# Patient Record
Sex: MALE | Race: WHITE | Employment: FULL TIME | URBAN - METROPOLITAN AREA
[De-identification: names, ages, dates, MRNs, and addresses within clinical notes are randomized per-mention and may not be internally consistent; named-entity substitution may affect disease eponyms.]

---

## 2020-09-29 ENCOUNTER — ANESTHESIA EVENT (OUTPATIENT)
Dept: SURGERY | Age: 40
End: 2020-09-29
Payer: COMMERCIAL

## 2020-09-29 ENCOUNTER — HOSPITAL ENCOUNTER (OUTPATIENT)
Age: 40
Setting detail: OUTPATIENT SURGERY
Discharge: HOME | End: 2020-09-29
Attending: OPHTHALMOLOGY | Admitting: OPHTHALMOLOGY
Payer: COMMERCIAL

## 2020-09-29 ENCOUNTER — ANESTHESIA (OUTPATIENT)
Dept: SURGERY | Age: 40
End: 2020-09-29
Payer: COMMERCIAL

## 2020-09-29 VITALS
BODY MASS INDEX: 32.26 KG/M2 | SYSTOLIC BLOOD PRESSURE: 127 MMHG | OXYGEN SATURATION: 100 % | HEIGHT: 71 IN | WEIGHT: 230.4 LBS | TEMPERATURE: 98.1 F | HEART RATE: 89 BPM | RESPIRATION RATE: 18 BRPM | DIASTOLIC BLOOD PRESSURE: 91 MMHG

## 2020-09-29 LAB
COVID-19 RAPID TEST, COVR: NOT DETECTED
SOURCE, COVRS: NORMAL
SPECIMEN TYPE, XMCV1T: NORMAL

## 2020-09-29 PROCEDURE — 77030026076 HC LNS MCSCP SPRVW DSP MCGM -B: Performed by: OPHTHALMOLOGY

## 2020-09-29 PROCEDURE — 74011250637 HC RX REV CODE- 250/637: Performed by: NURSE ANESTHETIST, CERTIFIED REGISTERED

## 2020-09-29 PROCEDURE — 74011250636 HC RX REV CODE- 250/636: Performed by: NURSE ANESTHETIST, CERTIFIED REGISTERED

## 2020-09-29 PROCEDURE — 74011000250 HC RX REV CODE- 250: Performed by: OPHTHALMOLOGY

## 2020-09-29 PROCEDURE — 00145 ANES PX EYE VITREORTNL SURG: CPT | Performed by: ANESTHESIOLOGY

## 2020-09-29 PROCEDURE — 77030013311: Performed by: OPHTHALMOLOGY

## 2020-09-29 PROCEDURE — 2709999900 HC NON-CHARGEABLE SUPPLY: Performed by: OPHTHALMOLOGY

## 2020-09-29 PROCEDURE — 76010000138 HC OR TIME 0.5 TO 1 HR: Performed by: OPHTHALMOLOGY

## 2020-09-29 PROCEDURE — 76060000032 HC ANESTHESIA 0.5 TO 1 HR: Performed by: OPHTHALMOLOGY

## 2020-09-29 PROCEDURE — 00145 ANES PX EYE VITREORTNL SURG: CPT | Performed by: NURSE ANESTHETIST, CERTIFIED REGISTERED

## 2020-09-29 PROCEDURE — 87635 SARS-COV-2 COVID-19 AMP PRB: CPT

## 2020-09-29 PROCEDURE — 74011250636 HC RX REV CODE- 250/636: Performed by: OPHTHALMOLOGY

## 2020-09-29 PROCEDURE — 76210000020 HC REC RM PH II FIRST 0.5 HR: Performed by: OPHTHALMOLOGY

## 2020-09-29 RX ORDER — FAMOTIDINE 20 MG/1
20 TABLET, FILM COATED ORAL ONCE
Status: COMPLETED | OUTPATIENT
Start: 2020-09-29 | End: 2020-09-29

## 2020-09-29 RX ORDER — FAMOTIDINE 20 MG/1
20 TABLET, FILM COATED ORAL 2 TIMES DAILY
COMMUNITY

## 2020-09-29 RX ORDER — CYCLOPENTOLATE HYDROCHLORIDE 10 MG/ML
1 SOLUTION/ DROPS OPHTHALMIC
Status: COMPLETED | OUTPATIENT
Start: 2020-09-29 | End: 2020-09-29

## 2020-09-29 RX ORDER — GENTAMICIN SULFATE 40 MG/ML
INJECTION, SOLUTION INTRAMUSCULAR; INTRAVENOUS AS NEEDED
Status: DISCONTINUED | OUTPATIENT
Start: 2020-09-29 | End: 2020-09-29 | Stop reason: HOSPADM

## 2020-09-29 RX ORDER — DEXAMETHASONE SODIUM PHOSPHATE 4 MG/ML
INJECTION, SOLUTION INTRA-ARTICULAR; INTRALESIONAL; INTRAMUSCULAR; INTRAVENOUS; SOFT TISSUE AS NEEDED
Status: DISCONTINUED | OUTPATIENT
Start: 2020-09-29 | End: 2020-09-29 | Stop reason: HOSPADM

## 2020-09-29 RX ORDER — MIDAZOLAM HYDROCHLORIDE 1 MG/ML
INJECTION, SOLUTION INTRAMUSCULAR; INTRAVENOUS AS NEEDED
Status: DISCONTINUED | OUTPATIENT
Start: 2020-09-29 | End: 2020-09-29 | Stop reason: HOSPADM

## 2020-09-29 RX ORDER — LIDOCAINE HYDROCHLORIDE 10 MG/ML
0.1 INJECTION, SOLUTION EPIDURAL; INFILTRATION; INTRACAUDAL; PERINEURAL AS NEEDED
Status: DISCONTINUED | OUTPATIENT
Start: 2020-09-29 | End: 2020-09-29 | Stop reason: HOSPADM

## 2020-09-29 RX ORDER — PHENYLEPHRINE HYDROCHLORIDE 25 MG/ML
1 SOLUTION/ DROPS OPHTHALMIC
Status: COMPLETED | OUTPATIENT
Start: 2020-09-29 | End: 2020-09-29

## 2020-09-29 RX ORDER — SODIUM CHLORIDE, SODIUM LACTATE, POTASSIUM CHLORIDE, CALCIUM CHLORIDE 600; 310; 30; 20 MG/100ML; MG/100ML; MG/100ML; MG/100ML
50 INJECTION, SOLUTION INTRAVENOUS CONTINUOUS
Status: DISCONTINUED | OUTPATIENT
Start: 2020-09-29 | End: 2020-09-29 | Stop reason: HOSPADM

## 2020-09-29 RX ORDER — ATORVASTATIN CALCIUM 20 MG/1
20 TABLET, FILM COATED ORAL DAILY
COMMUNITY

## 2020-09-29 RX ADMIN — MIDAZOLAM 2 MG: 1 INJECTION INTRAMUSCULAR; INTRAVENOUS at 16:15

## 2020-09-29 RX ADMIN — FAMOTIDINE 20 MG: 20 TABLET, FILM COATED ORAL at 12:37

## 2020-09-29 RX ADMIN — PHENYLEPHRINE HYDROCHLORIDE 1 DROP: 25 SOLUTION/ DROPS OPHTHALMIC at 13:00

## 2020-09-29 RX ADMIN — PHENYLEPHRINE HYDROCHLORIDE 1 DROP: 25 SOLUTION/ DROPS OPHTHALMIC at 13:58

## 2020-09-29 RX ADMIN — CYCLOPENTOLATE HYDROCHLORIDE 1 DROP: 10 SOLUTION/ DROPS OPHTHALMIC at 13:58

## 2020-09-29 RX ADMIN — CYCLOPENTOLATE HYDROCHLORIDE 1 DROP: 10 SOLUTION/ DROPS OPHTHALMIC at 13:00

## 2020-09-29 RX ADMIN — SODIUM CHLORIDE, SODIUM LACTATE, POTASSIUM CHLORIDE, AND CALCIUM CHLORIDE 50 ML/HR: 600; 310; 30; 20 INJECTION, SOLUTION INTRAVENOUS at 12:35

## 2020-09-29 RX ADMIN — CYCLOPENTOLATE HYDROCHLORIDE 1 DROP: 10 SOLUTION/ DROPS OPHTHALMIC at 13:29

## 2020-09-29 RX ADMIN — PHENYLEPHRINE HYDROCHLORIDE 1 DROP: 25 SOLUTION/ DROPS OPHTHALMIC at 13:29

## 2020-09-29 RX ADMIN — MIDAZOLAM 2 MG: 1 INJECTION INTRAMUSCULAR; INTRAVENOUS at 16:28

## 2020-09-29 NOTE — DISCHARGE INSTRUCTIONS
Vitrectomy: What to Expect at [address] [facility]     Vitrectomy is a surgery to remove the vitreous gel from the middle of your eye. Vitreous gel (also called vitreous humor) is a thick, colorless, gel-like fluid that fills the large space in the middle of the eye, behind the lens. It helps the eyeball maintain its shape. During surgery, the doctor used small tools to remove the vitreous gel. (After a while, the eye makes new fluid that fills in the space again.) Then the doctor may have treated eye problems, such as a retinal detachment, a vitreous hemorrhage (bleeding in the eye), scar tissue on the retina, or tears or holes in the macula, an important part of the retina. The retina is the layer of nerve tissue at the back of the eye. At the end of the surgery, the doctor may have injected an oil or gas bubble into the eye. It lightly presses the retina against the wall of the eye. You will need to keep your head in a certain position for most of the day and night while the eye heals. If an oil bubble is used, you will need another surgery to remove the oil after the eye has healed. After the surgery, your eye may be swollen, red, or tender for several weeks. You might have some pain in your eye and your vision may be blurry for a few days after the surgery. You will need 2 to 4 weeks to recover before you can do your normal activities again. It may take longer for your vision to get back to normal.  This care sheet gives you a general idea about how long it will take for you to recover. But each person recovers at a different pace. Follow the steps below to get better as quickly as possible. How can you care for yourself at home? Activity    · Rest when you feel tired.     · Allow the eye to heal. Don't do things that might cause you to move your head.  This includes moving quickly, lifting anything heavy, or doing activities such as cleaning or gardening.     · If your doctor used an oil or gas bubble to hold the retina in place, keep your head in a certain position for most of the day and night for 1 to 3 weeks after the surgery. Make a plan for this part of your recovery, because it will be hard to do some daily activities. Your doctor will give you specific instructions. ? Do not lie on your back, or the bubble will move to the front of the eye and press against the lens instead of the retina.     · If your doctor used a gas bubble, avoid airplane travel until your doctor tells you it is safe. This is because the change in altitude may cause the gas bubble to expand and increase the pressure inside the eye.     · You will probably need to take 2 to 4 weeks off from work. It depends on the type of work you do and how you feel.     · You may drive when your vision allows it. If you are not sure, ask your doctor. Diet    · You can eat your normal diet. If your stomach is upset, try bland, low-fat foods like plain rice, broiled chicken, toast, and yogurt. Medicines    · Your doctor will tell you if and when you can restart your medicines. He or she will also give you instructions about taking any new medicines.     · If you take aspirin or some other blood thinner, be sure to talk to your doctor. He or she will tell you if and when to start taking this medicine again. Make sure that you understand exactly what your doctor wants you to do.     · Be safe with medicines. Read and follow all instructions on the label. ? If the doctor gave you a prescription medicine for pain, take it as prescribed. ? If you are not taking a prescription pain medicine, ask your doctor if you can take an over-the-counter medicine.     · You will need to use eyedrops for up to 6 weeks. Ice and elevation    · Close your eye and put ice or a cold pack on it for 10 to 20 minutes at a time. Try to do this every 1 to 2 hours for the next 3 days (when you are awake) or until the swelling goes down.  Put a thin cloth between the ice and your skin. Other instructions    · You can shower and wash your hair and face. But don't get any soap in your eye. You may want to use a wash cloth to wash your face. Some people wear swimming goggles.     · Wear sunglasses during the day. You may have to wear an eye patch or shield for a few days. Follow-up care is a key part of your treatment and safety. Be sure to make and go to all appointments, and call your doctor if you are having problems. It's also a good idea to know your test results and keep a list of the medicines you take. When should you call for help? Call 911 anytime you think you may need emergency care. For example, call if:    · You have a sudden loss of vision.     · You have severe eye pain. Call your doctor now or seek immediate medical care if:    · Your vision gets worse.     · You have new or increasing eye pain.     · You have symptoms of an eye infection, such as:  ? Pus or thick discharge coming from the eye.  ? Redness or swelling around the eye.  ? A fever.     · You have vision changes or see new flashes or floaters. (Flashes are \"osullivan\" that you may see when you move your head. Floaters are shadows or dark objects that \"float\" across your field of vision.)   Watch closely for changes in your health, and be sure to contact your doctor if:    · You do not get better as expected. Where can you learn more? Go to http://www.gray.com/  Enter T466 in the search box to learn more about \"Vitrectomy: What to Expect at Home. \"  Current as of: December 18, 2019               Content Version: 12.6  © 2059-0283 [employer]. Care instructions adapted under license by [employer] (which disclaims liability or warranty for this information).  If you have questions about a medical condition or this instruction, always ask your healthcare professional. [address] any warranty or liability for your use of this information. Surgery for Retinal Detachment: What to Expect at [address] [place] [address] Drive have had surgery to fix a retinal detachment. Your doctor may also have fixed a tear in your retina. Your eye doctor may put drops in your eye to prevent infection and keep the pupil from opening wide or closing. You will also use these drops at home. You may have to wear a patch or shield over the eye for a day or more. You may have some pain in your eye and your vision may be blurry for a few days after the surgery. Your eye may be swollen, red, or tender for several weeks. If your doctor used a gas bubble to flatten your retina during surgery, you may have to keep your head in a special position for a few days or longer. Your doctor will give you special instructions about this. You will need 2 to 4 weeks to recover before returning to your normal activities. This care sheet gives you a general idea about how long it will take for you to recover. But each person recovers at a different pace. Follow the steps below to get better as quickly as possible. How can you care for yourself at home? Activity    · Rest when you feel tired.     · Allow the eye to heal. Don't do things where you might move your head. This includes moving quickly, lifting anything heavy, or doing activities such as cleaning or gardening.     · You will probably need to take 2 to 4 weeks off from work. It depends on the type of work you do and how you feel.     · You may drive when your vision allows it. If you are not sure, ask your doctor. Diet    · You can eat your normal diet. If your stomach is upset, try bland, low-fat foods like plain rice, broiled chicken, toast, and yogurt. Medicines    · Your doctor will tell you if and when you can restart your medicines. You will also get instructions about taking any new medicines.     · If you take aspirin or some other blood thinner, ask your doctor if and when to start taking it again. Make sure that you understand exactly what your doctor wants you to do.     · Be safe with medicines. Read and follow all instructions on the label. ? If the doctor gave you a prescription medicine for pain, take it as prescribed. ? If you are not taking a prescription pain medicine, ask your doctor if you can take an over-the-counter medicine.     · You will need to use eye drops for up to 6 weeks. Ice and elevation    · Close your eye and put ice or a cold pack on it for 10 to 20 minutes at a time. Try to do this every 1 to 2 hours for the next 3 days (when you are awake) or until the swelling goes down. Put a thin cloth between the ice and your skin. Other instructions    · You can shower and wash your hair and face. But don't get any soap in your eye. You may want to use a wash cloth when you wash your face. Some people wear swimming goggles.     · Wear sunglasses during the day. You may have to wear an eye patch or shield for a few days.     · If your doctor used a gas bubble to hold the retina in place, keep your head in a certain position for most of the day and night for 1 to 3 weeks after the surgery. Your doctor will give you specific instructions. ? Do not lie on your back. The bubble will move to the front of the eye and press against the lens instead of the retina. ? Airplane travel is dangerous. This is because the change in altitude may cause the gas bubble to expand and increase the pressure inside the eye. Follow-up care is a key part of your treatment and safety. Be sure to make and go to all appointments, and call your doctor if you are having problems. It's also a good idea to know your test results and keep a list of the medicines you take. When should you call for help? Call 911 anytime you think you may need emergency care. For example, call if:    · You passed out (lost consciousness).     · You have sudden chest pain, are short of breath, or cough up blood.    Call your doctor now or seek immediate medical care if:    · You have symptoms of an eye infection, such as:  ? Pus or thick discharge coming from the eye.  ? Redness or swelling around the eye.  ? A fever.     · You have new or worse eye pain.     · You have vision changes.     · You see new flashes of light.     · You have symptoms of a blood clot in your leg (called a deep vein thrombosis), such as:  ? Pain in the calf, back of the knee, thigh, or groin. ? Redness and swelling in your leg or groin. Watch closely for changes in your health, and be sure to contact your doctor if:    · You see new floaters.     · You do not get better as expected. Where can you learn more? Go to http://www.gray.com/  Enter R180 in the search box to learn more about \"Surgery for Retinal Detachment: What to Expect at Home. \"  Current as of: December 18, 2019               Content Version: 12.6  © 4964-5203 [employer]. Care instructions adapted under license by [employer] (which disclaims liability or warranty for this information). If you have questions about a medical condition or this instruction, always ask your healthcare professional. [place] [address] any warranty or liability for your use of this information. Learning About Coronavirus [phone])  Coronavirus [phone]): Overview  What is coronavirus (COVID-19)? The coronavirus disease (COVID-19) is caused by a virus. It is an illness that was first found in December 2019. It has since spread worldwide. The virus can cause fever, cough, and trouble breathing. In severe cases, it can cause pneumonia and make it hard to breathe without help. It can cause death. This virus spreads person-to-person through droplets from coughing and sneezing. It can also spread when you are close to someone who is infected.  And it can spread when you touch something that has the virus on it, such as a doorknob or a tabletop. Coronaviruses are a large group of viruses. They cause the common cold. They also cause more serious illnesses like Middle East respiratory syndrome (MERS) and severe acute respiratory syndrome (SARS). COVID-19 is caused by a novel coronavirus. That means it's a new type that has not been seen in people before. How is COVID-19 treated? Mild illness can be treated at home, but more serious illness needs to be treated in the hospital. Treatment may include medicines to reduce symptoms, plus breathing support such as oxygen therapy or a ventilator. Other treatments, such as antiviral medicines, may help people who have COVID-19. What can you do to protect yourself from COVID-19? The best way to protect yourself from getting sick is to:  · Avoid areas where there is an outbreak. · Avoid contact with people who may be infected. · Avoid crowds and try to stay at least 6 feet away from other people. · Wash your hands often, especially after you cough or sneeze. Use soap and water, and scrub for at least 20 seconds. If soap and water aren't available, use an alcohol-based hand . · Avoid touching your mouth, nose, and eyes. What can you do to avoid spreading the virus to others? To help avoid spreading the virus to others:  · Wash your hands often with soap or alcohol-based hand sanitizers. · Cover your mouth with a tissue when you cough or sneeze. Then throw the tissue in the trash. · Use a disinfectant to clean things that you touch often. These include doorknobs, remote controls, phones, and handles on your refrigerator and microwave. And don't forget countertops, tabletops, bathrooms, and computer keyboards. · Wear a cloth face cover if you have to go to public areas. If you know or suspect that you have COVID-19:  · Stay home. Don't go to school, work, or public areas. And don't use public transportation, ride-shares, or taxis unless you have no choice.   · Leave your home only if you need to get medical care or testing. But call the doctor's office first so they know you're coming. And wear a face cover. · Limit contact with people in your home. If possible, stay in a separate bedroom and use a separate bathroom. · Wear a face cover whenever you're around other people. It can help stop the spread of the virus when you cough or sneeze. · Clean and disinfect your home every day. Use household  and disinfectant wipes or sprays. Take special care to clean things that you grab with your hands. · Self-isolate until it's safe to be around others again. ? If you have symptoms, it's safe when you haven't had a fever for 3 days and your symptoms have improved and it's been at least 10 days since your symptoms started. ? If you were exposed to the virus but don't have symptoms, it's safe to be around others 14 days after exposure. ? Talk to your doctor about whether you also need testing, especially if you have a weakened immune system. When to call for help  Call 911 anytime you think you may need emergency care. For example, call if:  · You have severe trouble breathing. (You can't talk at all.)  · You have constant chest pain or pressure. · You are severely dizzy or lightheaded. · You are confused or can't think clearly. · Your face and lips have a blue color. · You passed out (lost consciousness) or are very hard to wake up. Call your doctor now if you develop symptoms such as:  · Shortness of breath. · Fever. · Cough. If you need to get care, call ahead to the doctor's office for instructions before you go. Make sure you wear a face cover to prevent exposing other people to the virus. Where can you get the latest information? The following health organizations are tracking and studying this virus. Their websites contain the most up-to-date information. Teresa Francis also learn what to do if you think you may have been exposed to the virus. · [place]  Centers for Disease Control and Prevention (CDC): The CDC provides updated news about the disease and travel advice. The website also tells you how to prevent the spread of infection. www.cdc.gov  · World Health Organization [facility]): WHO offers information about the virus outbreaks. WHO also has travel advice. www.who.int  Current as of: July 10, 2020               Content Version: 12.6  © 0324-7451 [employer]. Care instructions adapted under license by [employer] (which disclaims liability or warranty for this information). If you have questions about a medical condition or this instruction, always ask your healthcare professional. [place] [address] any warranty or liability for your use of this information.            Patient armband removed and shredded

## 2020-09-29 NOTE — ANESTHESIA PREPROCEDURE EVALUATION
Relevant Problems   No relevant active problems       Anesthetic History   No history of anesthetic complications            Review of Systems / Medical History  Patient summary reviewed, nursing notes reviewed and pertinent labs reviewed    Pulmonary  Within defined limits                 Neuro/Psych   Within defined limits           Cardiovascular              Hyperlipidemia    Exercise tolerance: >4 METS     GI/Hepatic/Renal  Within defined limits              Endo/Other  Within defined limits           Other Findings              Physical Exam    Airway  Mallampati: II  TM Distance: 4 - 6 cm  Neck ROM: normal range of motion   Mouth opening: Normal     Cardiovascular  Regular rate and rhythm,  S1 and S2 normal,  no murmur, click, rub, or gallop  Rhythm: regular  Rate: normal         Dental  No notable dental hx       Pulmonary  Breath sounds clear to auscultation               Abdominal  GI exam deferred       Other Findings            Anesthetic Plan    ASA: 2  Anesthesia type: MAC          Induction: Intravenous  Anesthetic plan and risks discussed with: Patient

## 2020-09-29 NOTE — PERIOP NOTES
Phase 2 Recovery Summary    Report received from [address] [place] [address] Pkwy:    09/29/20 1212 09/29/20 1707 09/29/20 1710   BP: 124/88 (!) 127/91 (!) 127/91   Pulse: 68 78 89   Resp: 18 16 18   Temp: 97.5 °F (36.4 °C) 98.6 °F (37 °C) 98.1 °F (36.7 °C)   SpO2: 98% 100%    Weight: 104.5 kg (230 lb 6.4 oz)     Height: 5' 11\" (1.803 m)         oriented to time, place, person and situation          Lines and Drains  Peripheral Intravenous Line:  Removed prior to discharge     Wound  Wound Eye Left (Active)   Dressing Status Clean, dry, and intact 09/29/20 1500   Dressing Type Eye patch; Eye shield 09/29/20 1500   Incision Site Well Approximated Yes 09/29/20 1500   Number of days: 0        Patient assisted to chair with minimal assist. Pateint tolerating liquids well. Discharge discussed with patient and family. No questions or concerns at this time. Patient had time to ask any questions. Pain at 0/10. Discharge medications reviewed with patient and appropriate educational materials and side effects teaching were provided. Patient discharged to home without incident.        Ioana Erwin RN

## 2020-09-29 NOTE — ANESTHESIA POSTPROCEDURE EVALUATION
Procedure(s):  Left Eye Repair of Retinal Detachment with vitrectomy , air/gas, , endolaser,  .     MAC    Anesthesia Post Evaluation      Multimodal analgesia: multimodal analgesia used between 6 hours prior to anesthesia start to PACU discharge  Patient location during evaluation: bedside  Patient participation: complete - patient participated  Level of consciousness: awake  Pain management: adequate  Airway patency: patent  Anesthetic complications: no  Cardiovascular status: stable  Respiratory status: acceptable  Hydration status: acceptable  Post anesthesia nausea and vomiting:  controlled      INITIAL Post-op Vital signs:   Vitals Value Taken Time   /91 9/29/2020  5:10 PM   Temp 36.7 °C (98.1 °F) 9/29/2020  5:10 PM   Pulse 89 9/29/2020  5:10 PM   Resp 18 9/29/2020  5:10 PM   SpO2

## 2020-09-29 NOTE — PERIOP NOTES
Pre-Op Summary    Pt arrived via car with family/friend and is oriented to time, place, person and situation. Patient with steady gait with none assistive devices. Visit Vitals  /88 (BP 1 Location: Left arm, BP Patient Position: At rest;Sitting)   Pulse 68   Temp 97.5 °F (36.4 °C)   Resp 18   Ht 5' 11\" (1.803 m)   Wt 104.5 kg (230 lb 6.4 oz)   SpO2 98%   BMI 32.13 kg/m²       Peripheral IV located on Left hand . Patients belongings [facility] at [facility]. Patient's point of contact is Nohemy and their contact number is: [phone]. They will be leaving and coming back. They are able to receive medication information. They will be their ride home.

## 2020-10-20 NOTE — OP NOTES
[facility]  OPERATIVE REPORT    Name:  Vanesa Baez  MR#:   [id number]  :  1980  ACCOUNT #:  [de-identified]  DATE OF SERVICE:  2020    PREOPERATIVE DIAGNOSIS:  Rhegmatogenous retinal detachment, multiple defects, of the left eye. POSTOPERATIVE DIAGNOSIS:  Rhegmatogenous retinal detachment, multiple defects, of the left eye. PROCEDURE PERFORMED:  1.  Pars plana vitrectomy. 2.  Air-fluid exchange. 3.  Endolaser photocoagulation. 4.  C3F8 gas injection, left eye. SURGEON:  Louise Young MD, FACS, FICS, VIRGIL    ASSISTANT:  None. ANESTHESIA:  MAC with Retrobulbar block    COMPLICATIONS:  None. SPECIMENS REMOVED:  None. IMPLANTS:  none    TUBES/DRAINS/SHUNTS:  None. ESTIMATED BLOOD LOSS:  Negligible. PROCEDURE:  Following identification and appropriate preoperative sedation, the patient was brought to the operating  theater and cardiovascular monitoring was established. The patient received a retrobulbar block comfortably and was stable throughout. The patient was prepped and draped in the usual sterile fashion for repair of retinal detachment of the left eye. A wide-angled viewing system attached to the microscope was used. A standard three-port pars plana vitrectomy was started using a 23-gauge trocar system. BSS with epinephrine was infused at 25 mmHg pressure. The core vitreous gel was taken. The retinal periphery was inspected and the retinal defects identified and marked with endodiathermy. Filtered perfluorochemical (Perfluoron) was injected in a serial fashion to flatten the retina and protect the macula. The retina flattened nicely. The peripheral vitreous gel was trimmed. Gentle scleral pressure was employed. No additional holes or tears were identified. The flaps of the retinal tears were excised sharply. The vitreous base was trimmed fully. With the retina fully attached, gentle air-fluid exchange was performed.   The retina remained flat and attached. There was no residual subretinal fluid. The previously identified retinal defects were treated with barrier laser photocoagulation. No new holes or tears were identified. There was no residual traction. Instruments and trocars were removed sequentially. Immediately prior to removing the final trocar, a total of 20 mL of sterile filtered 14% C3F8 gas was lavaged through the globe. Closing pressure was adjusted to 13 mmHg pressure. Subtenon's gentamicin and dexamethasone were placed. Maxitrol ointment followed. A sterile pressure dressing incorporating a Hassan metal shield was placed. The patient was \"log-rolled\" to a right lateral recumbent position with the nose pointed down. Condition improved. Prognosis is good.       Gurjit Alexis MD      [id number]  D:  10/19/2020 17:40  T:  10/20/2020 2:45  JOB #:  [id number]  CC:

## (undated) DEVICE — FILTER NEEDLE: Brand: MONOJECT

## (undated) DEVICE — SURGICAL PROCEDURE PACK VITRECTOMY EYE CUST

## (undated) DEVICE — NEEDLE HYPO 25GA L1.5IN BLU POLYPR HUB S STL REG BVL STR

## (undated) DEVICE — NEEDLE HYPO 30GA L0.5IN BGE POLYPR HUB S STL REG BVL STR

## (undated) DEVICE — 3M™ TEGADERM™ TRANSPARENT FILM DRESSING FRAME STYLE, 1624W, 2-3/8 IN X 2-3/4 IN (6 CM X 7 CM), 100/CT 4CT/CASE: Brand: 3M™ TEGADERM™

## (undated) DEVICE — CANNULA ANTR CHMBR OPHTH WASHOUT 19GA

## (undated) DEVICE — SUPER VIEW® STERILE LENS PACK FOR USE WITH THE SUPER VIEW® SYSTEM AND THE BIOM®: Brand: SUPER VIEW® DISPOSABLE LENS SET

## (undated) DEVICE — OPHTHALMIC PK ACCURS 25G CUST --

## (undated) DEVICE — STERILE LATEX POWDER-FREE SURGICAL GLOVESWITH NITRILE COATING: Brand: PROTEXIS